# Patient Record
(demographics unavailable — no encounter records)

---

## 2025-07-08 NOTE — PHYSICAL EXAM
[Chaperoned Physical Exam] : A chaperone was present in the examining room during all aspects of the physical examination. [MA] : MA [Appropriately responsive] : appropriately responsive [Alert] : alert [No Acute Distress] : no acute distress [No Lymphadenopathy] : no lymphadenopathy [Non-distended] : non-distended [No Lesions] : no lesions [No Mass] : no mass [Oriented x3] : oriented x3 [Vulvar Atrophy] : vulvar atrophy [Atrophy] : atrophy [Normal] : normal [Uterine Adnexae] : non-palpable [FreeTextEntry2] : Katty Zimmerman [FreeTextEntry3] : No thyromegaly  [FreeTextEntry6] : No masses, no axillary adenopathy, and no nipple discharge  [FreeTextEntry7] : Soft, non-tender, no adenopathy, no HSM, Healed Pfannenstiel incision, Elevated BMI [FreeTextEntry9] : Normal, guaiac negative

## 2025-07-08 NOTE — PLAN
[FreeTextEntry1] : Patient here for an annual well woman encounter. hx of recurrently positive HPV with normal pap smears, normal  colposcopy Patient with atrophic gynecological exam. Elevated  BMI. Regular, vigorous   exercise  and  weight bearing  exercise  encouraged/ weight reduction  counselled. Pap smear was done. Referral provided today for mammogram and sonogram. Referral provided today for DEXA. Patient to follow-up for annual gynecological follow-up or as needed. PHQ-9 questionnaire reviewed with patient. Patient scored 4  I, Jack Qureshi, acted solely as a scribe for Dr. Casey Arechiga on this date 07/08/2025.   All medical record entries made by the Scribe were at my, Dr. Casey Arechiga, direction on 07/08/2025. I have reviewed the chart and agree that the record accurately reflects my personal performance of the history, physical exam, assessment and plan.

## 2025-07-08 NOTE — HISTORY OF PRESENT ILLNESS
[FreeTextEntry1] : Patient is a 61-year-old here for an annual well woman encounter.  Patient notes difficulty sleeping and fatigue. Patient denies any vaginal discharge, itching and burning.  Patients last mammogram and sonogram was Sept 2024, showing Category 2 benign findings. Patients her last colonoscopy was in 2016. Patient states she had one more recently and will inform the date. Patients last bone densitometry was 7-8 years ago.  Medications: Propranolol, Atorvastatin Allergies: NKDA PMHx: Recurrently positive HPV with normal pap smears, Colposcopy in 2022 with benign findings PSHx: Left knee arthroscopy, Right knee fracture repair, right ovarian cystectomy, Appendectomy, Laparoscopic cholecystectomy, Small recurrent umbilical hernia